# Patient Record
Sex: FEMALE | Race: WHITE | NOT HISPANIC OR LATINO
[De-identification: names, ages, dates, MRNs, and addresses within clinical notes are randomized per-mention and may not be internally consistent; named-entity substitution may affect disease eponyms.]

---

## 2018-04-29 PROBLEM — Z00.00 ENCOUNTER FOR PREVENTIVE HEALTH EXAMINATION: Status: ACTIVE | Noted: 2018-04-29

## 2018-05-17 ENCOUNTER — RECORD ABSTRACTING (OUTPATIENT)
Age: 48
End: 2018-05-17

## 2018-05-17 DIAGNOSIS — Z78.9 OTHER SPECIFIED HEALTH STATUS: ICD-10-CM

## 2018-05-17 DIAGNOSIS — Z87.09 PERSONAL HISTORY OF OTHER DISEASES OF THE RESPIRATORY SYSTEM: ICD-10-CM

## 2018-05-17 RX ORDER — MESALAMINE 1.2 G/1
1.2 TABLET, DELAYED RELEASE ORAL
Refills: 0 | Status: ACTIVE | COMMUNITY

## 2018-05-17 RX ORDER — LEVOTHYROXINE SODIUM 75 UG/1
75 TABLET ORAL
Refills: 0 | Status: ACTIVE | COMMUNITY

## 2018-05-29 ENCOUNTER — APPOINTMENT (OUTPATIENT)
Dept: BREAST CENTER | Facility: CLINIC | Age: 48
End: 2018-05-29
Payer: COMMERCIAL

## 2018-05-29 VITALS
HEART RATE: 80 BPM | DIASTOLIC BLOOD PRESSURE: 71 MMHG | HEIGHT: 66 IN | WEIGHT: 123 LBS | BODY MASS INDEX: 19.77 KG/M2 | SYSTOLIC BLOOD PRESSURE: 109 MMHG

## 2018-05-29 PROCEDURE — 99214 OFFICE O/P EST MOD 30 MIN: CPT

## 2018-12-11 ENCOUNTER — APPOINTMENT (OUTPATIENT)
Dept: BREAST CENTER | Facility: CLINIC | Age: 48
End: 2018-12-11
Payer: COMMERCIAL

## 2018-12-11 VITALS
HEIGHT: 66 IN | SYSTOLIC BLOOD PRESSURE: 120 MMHG | BODY MASS INDEX: 20.09 KG/M2 | DIASTOLIC BLOOD PRESSURE: 74 MMHG | HEART RATE: 76 BPM | WEIGHT: 125 LBS

## 2018-12-11 PROCEDURE — 99214 OFFICE O/P EST MOD 30 MIN: CPT

## 2019-06-04 ENCOUNTER — APPOINTMENT (OUTPATIENT)
Dept: BREAST CENTER | Facility: CLINIC | Age: 49
End: 2019-06-04
Payer: COMMERCIAL

## 2019-06-04 VITALS
DIASTOLIC BLOOD PRESSURE: 74 MMHG | HEART RATE: 75 BPM | SYSTOLIC BLOOD PRESSURE: 110 MMHG | BODY MASS INDEX: 20.09 KG/M2 | HEIGHT: 66 IN | WEIGHT: 125 LBS

## 2019-06-04 PROCEDURE — 99214 OFFICE O/P EST MOD 30 MIN: CPT

## 2019-06-04 NOTE — PHYSICAL EXAM
[Normocephalic] : normocephalic [Atraumatic] : atraumatic [Supple] : supple [No Supraclavicular Adenopathy] : no supraclavicular adenopathy [No Cervical Adenopathy] : no cervical adenopathy [No Thyromegaly] : no thyromegaly [Normal Sinus Rhythm] : normal sinus rhythm [Examined in the supine and seated position] : examined in the supine and seated position [No dominant masses] : no dominant masses in right breast  [No dominant masses] : no dominant masses left breast [No Nipple Retraction] : no left nipple retraction [No Nipple Discharge] : no right nipple discharge [No Axillary Lymphadenopathy] : no left axillary lymphadenopathy [No Edema] : no edema [No Rashes] : no rashes [No Ulceration] : no ulceration

## 2019-06-04 NOTE — HISTORY OF PRESENT ILLNESS
[FreeTextEntry1] : Dx'ed w/ ischemic colitis s/t anti phospholipid AB's. On ASAb\par Dx'ed w/ asthma. On Rx.\par +FH Br Ca (Mother 42 (BRCA-), M. Gr Aunt 50's)\par +FH Ov Ca (M. GM)\par  +Ashkenazi ancestry\par NCI Gwendolyn Risk (2014): 31.7%\par No other MH/FH changes. ROS reviewed/discussed. LMP 27 d's, reg. Taking Ca/Vit D.\par Mammo (4/2/19); HD, NSF\par MRI (7/30/18): NSF

## 2019-12-17 ENCOUNTER — APPOINTMENT (OUTPATIENT)
Dept: BREAST CENTER | Facility: CLINIC | Age: 49
End: 2019-12-17

## 2020-01-15 ENCOUNTER — APPOINTMENT (OUTPATIENT)
Dept: BREAST CENTER | Facility: CLINIC | Age: 50
End: 2020-01-15
Payer: COMMERCIAL

## 2020-01-15 VITALS
HEIGHT: 66 IN | HEART RATE: 82 BPM | WEIGHT: 128 LBS | DIASTOLIC BLOOD PRESSURE: 84 MMHG | SYSTOLIC BLOOD PRESSURE: 133 MMHG | BODY MASS INDEX: 20.57 KG/M2

## 2020-01-15 DIAGNOSIS — Z86.018 PERSONAL HISTORY OF OTHER BENIGN NEOPLASM: ICD-10-CM

## 2020-01-15 PROCEDURE — 99214 OFFICE O/P EST MOD 30 MIN: CPT

## 2020-01-15 NOTE — HISTORY OF PRESENT ILLNESS
[FreeTextEntry1] : Dx'ed w/ ischemic colitis s/t anti phospholipid AB's. On ASAb\par Dx'ed w/ asthma. On Rx.\par +FH Br Ca (Mother 42 (BRCA-), M. Gr Aunt 50's)\par +FH Ov Ca (M. GM)\par  +Ashkenazi ancestry\par NCI Gwendolyn Risk (2014): 31.7%\par Pt had Pelvic MRI (11/19) > +fibroids\par No other MH/FH changes. ROS reviewed/discussed. LMP 2 wks, reg. Taking Ca/Vit D.\par Mammo (4/2/19); HD, NSF\par MRI (10/30/19): NSF

## 2020-01-15 NOTE — REVIEW OF SYSTEMS
[Recent Weight Gain (___ Lbs)] : recent [unfilled] ~Ulb weight gain [Shortness Of Breath] : shortness of breath [Abdominal Pain] : abdominal pain [Diarrhea] : diarrhea [Negative] : Heme/Lymph

## 2020-10-20 ENCOUNTER — TRANSCRIPTION ENCOUNTER (OUTPATIENT)
Age: 50
End: 2020-10-20

## 2020-10-20 ENCOUNTER — APPOINTMENT (OUTPATIENT)
Dept: BREAST CENTER | Facility: CLINIC | Age: 50
End: 2020-10-20
Payer: COMMERCIAL

## 2020-10-20 VITALS
WEIGHT: 130 LBS | SYSTOLIC BLOOD PRESSURE: 143 MMHG | BODY MASS INDEX: 20.89 KG/M2 | DIASTOLIC BLOOD PRESSURE: 83 MMHG | HEIGHT: 66 IN | HEART RATE: 111 BPM

## 2020-10-20 PROCEDURE — 99214 OFFICE O/P EST MOD 30 MIN: CPT

## 2020-10-20 NOTE — PHYSICAL EXAM
[Atraumatic] : atraumatic [Normocephalic] : normocephalic [Supple] : supple [No Supraclavicular Adenopathy] : no supraclavicular adenopathy [No Thyromegaly] : no thyromegaly [No Cervical Adenopathy] : no cervical adenopathy [Normal Sinus Rhythm] : normal sinus rhythm [Examined in the supine and seated position] : examined in the supine and seated position [No dominant masses] : no dominant masses in right breast  [No dominant masses] : no dominant masses left breast [No Nipple Retraction] : no left nipple retraction [No Nipple Discharge] : no left nipple discharge [No Axillary Lymphadenopathy] : no right axillary lymphadenopathy [No Edema] : no edema [No Ulceration] : no ulceration [No Rashes] : no rashes

## 2020-10-20 NOTE — HISTORY OF PRESENT ILLNESS
[FreeTextEntry1] : Dx'ed w/ ischemic colitis s/t anti phospholipid AB's. On ASAb\par Dx'ed w/ asthma. On Rx.\par +FH Br Ca (Mother 42 (BRCA-), M. Gr Aunt 50's)\par +FH Ov Ca (M. GM)\par  +Ashkenazi ancestry\par NCI Gwendolyn Risk (2014): 31.7%\par Pt had Pelvic MRI (11/19) > +fibroids\par No other MH/FH changes. ROS reviewed/discussed. LMP 2 wks, reg. Taking Ca/Vit D.\par Mammo (4/2/19); HD, NSF\par MRI (10/16/20): +3mm enh mass L 6:00 > L MRI Bx rec'ed

## 2021-05-20 ENCOUNTER — APPOINTMENT (OUTPATIENT)
Dept: BREAST CENTER | Facility: CLINIC | Age: 51
End: 2021-05-20
Payer: COMMERCIAL

## 2021-05-20 ENCOUNTER — NON-APPOINTMENT (OUTPATIENT)
Age: 51
End: 2021-05-20

## 2021-05-20 VITALS — WEIGHT: 123 LBS | HEIGHT: 66 IN | BODY MASS INDEX: 19.77 KG/M2

## 2021-05-20 DIAGNOSIS — Z12.31 ENCOUNTER FOR SCREENING MAMMOGRAM FOR MALIGNANT NEOPLASM OF BREAST: ICD-10-CM

## 2021-05-20 PROCEDURE — 99072 ADDL SUPL MATRL&STAF TM PHE: CPT

## 2021-05-20 PROCEDURE — 99214 OFFICE O/P EST MOD 30 MIN: CPT

## 2021-05-20 NOTE — PHYSICAL EXAM
[Normocephalic] : normocephalic [Atraumatic] : atraumatic [Supple] : supple [No Supraclavicular Adenopathy] : no supraclavicular adenopathy [No Cervical Adenopathy] : no cervical adenopathy [No Thyromegaly] : no thyromegaly [Normal Sinus Rhythm] : normal sinus rhythm [Examined in the supine and seated position] : examined in the supine and seated position [No dominant masses] : no dominant masses in right breast  [No dominant masses] : no dominant masses left breast [No Nipple Retraction] : no left nipple retraction [No Nipple Discharge] : no left nipple discharge [No Axillary Lymphadenopathy] : no left axillary lymphadenopathy [No Edema] : no edema [No Rashes] : no rashes [No Ulceration] : no ulceration [de-identified] : +scattered, dense FC tissue\par NSF [de-identified] : +scattered, dense FC tissue\par NSF

## 2021-05-20 NOTE — HISTORY OF PRESENT ILLNESS
[FreeTextEntry1] : Dx'ed w/ ischemic colitis s/t anti phospholipid AB's. On ASAb\par Dx'ed w/ asthma. On Rx.\par +FH Br Ca (Mother 42 (BRCA-), M. Gr Aunt 50's)\par +FH Ov Ca (M. GM)\par  +Ashkenazi ancestry\par NCI Gwendolyn Risk (2014): 31.7%\par Pt had Pelvic MRI (11/19) > +fibroids\par No other MH/FH changes. ROS reviewed/discussed. LMP 2 wks, reg. Taking Ca/Vit D.\par R Mag Views (6/5/20): R UOQ calc's benign\par Mammo (6/1/20); HD, +1.5cm calc's R UOQ > R mag views rec'ed\par MRI (10/16/20): +3mm enh mass L 6:00 > L MRI Bx rec'ed

## 2021-11-23 ENCOUNTER — APPOINTMENT (OUTPATIENT)
Dept: BREAST CENTER | Facility: CLINIC | Age: 51
End: 2021-11-23
Payer: COMMERCIAL

## 2021-11-23 VITALS
SYSTOLIC BLOOD PRESSURE: 119 MMHG | DIASTOLIC BLOOD PRESSURE: 78 MMHG | BODY MASS INDEX: 20.09 KG/M2 | HEART RATE: 89 BPM | HEIGHT: 66 IN | WEIGHT: 125 LBS

## 2021-11-23 DIAGNOSIS — L23.9 ALLERGIC CONTACT DERMATITIS, UNSPECIFIED CAUSE: ICD-10-CM

## 2021-11-23 PROCEDURE — 99214 OFFICE O/P EST MOD 30 MIN: CPT

## 2021-11-23 RX ORDER — LEVOTHYROXINE SODIUM 88 UG/1
88 TABLET ORAL
Refills: 0 | Status: ACTIVE | COMMUNITY

## 2021-11-23 NOTE — HISTORY OF PRESENT ILLNESS
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 On ASAb\par Dx'ed w/ asthma. On Rx.\par +FH Br Ca (Mother 42 (BRCA-), M. Gr Aunt 50's)\par +FH Ov Ca (M. GM)\par  +Ashkenazi ancestry\par NCI Gwendolyn Risk (2014): 31.7%\par Pt had Pelvic MRI (11/19) > +fibroids\par Got Pfizer booster (10/21)(LUE)\par No other MH/FH changes. ROS reviewed/discussed. LMP 1 wk, irreg, HF's > poss neel M. Taking Ca/Vit D.\par R Mag Views (6/5/20): R UOQ calc's benign\par Mammo (6/10/21); HD, +prob bilat cyst slusters > F/U dx'ic Mammo/R targeted Sono (12/21) rec'ed\par MRI (6/14/21): +sig background enh, NSF

## 2021-11-23 NOTE — PHYSICAL EXAM
[Normocephalic] : normocephalic [Atraumatic] : atraumatic [Supple] : supple [No Supraclavicular Adenopathy] : no supraclavicular adenopathy [No Cervical Adenopathy] : no cervical adenopathy [No Thyromegaly] : no thyromegaly [Normal Sinus Rhythm] : normal sinus rhythm [Examined in the supine and seated position] : examined in the supine and seated position [No dominant masses] : no dominant masses in right breast  [No dominant masses] : no dominant masses left breast [No Nipple Retraction] : no left nipple retraction [No Nipple Discharge] : no left nipple discharge [No Axillary Lymphadenopathy] : no left axillary lymphadenopathy [No Edema] : no edema [No Rashes] : no rashes [No Ulceration] : no ulceration [de-identified] : +dense, tender FC tissue\par NSF [de-identified] : +dense, tender FC tissue\par NSF

## 2022-05-24 ENCOUNTER — APPOINTMENT (OUTPATIENT)
Dept: BREAST CENTER | Facility: CLINIC | Age: 52
End: 2022-05-24
Payer: COMMERCIAL

## 2022-05-24 VITALS
DIASTOLIC BLOOD PRESSURE: 88 MMHG | HEIGHT: 66 IN | WEIGHT: 123 LBS | HEART RATE: 87 BPM | SYSTOLIC BLOOD PRESSURE: 118 MMHG | BODY MASS INDEX: 19.77 KG/M2

## 2022-05-24 PROCEDURE — 99214 OFFICE O/P EST MOD 30 MIN: CPT

## 2022-05-24 RX ORDER — ALBUTEROL SULFATE 90 UG/1
INHALANT RESPIRATORY (INHALATION)
Refills: 0 | Status: DISCONTINUED | COMMUNITY
End: 2022-05-24

## 2022-05-24 RX ORDER — FLUTICASONE FUROATE AND VILANTEROL TRIFENATATE 50; 25 UG/1; UG/1
POWDER RESPIRATORY (INHALATION)
Refills: 0 | Status: DISCONTINUED | COMMUNITY
End: 2022-05-24

## 2022-05-24 RX ORDER — HYDROXYCHLOROQUINE SULFATE 200 MG/1
TABLET ORAL
Refills: 0 | Status: ACTIVE | COMMUNITY

## 2022-05-24 NOTE — PHYSICAL EXAM
[Normocephalic] : normocephalic [Atraumatic] : atraumatic [Supple] : supple [No Supraclavicular Adenopathy] : no supraclavicular adenopathy [No Cervical Adenopathy] : no cervical adenopathy [No Thyromegaly] : no thyromegaly [Normal Sinus Rhythm] : normal sinus rhythm [Examined in the supine and seated position] : examined in the supine and seated position [No dominant masses] : no dominant masses in right breast  [No dominant masses] : no dominant masses left breast [No Nipple Retraction] : no left nipple retraction [No Nipple Discharge] : no left nipple discharge [No Axillary Lymphadenopathy] : no left axillary lymphadenopathy [No Edema] : no edema [No Rashes] : no rashes [No Ulceration] : no ulceration [de-identified] : +tender, dense FC tissue\par NSF [de-identified] : +tender, dense FC tissue\par NSF

## 2022-05-24 NOTE — REVIEW OF SYSTEMS
[Recent Weight Loss (___ Lbs)] : recent [unfilled] ~Ulb weight loss [Cough] : cough [Diarrhea] : diarrhea [Negative] : Heme/Lymph

## 2022-05-24 NOTE — HISTORY OF PRESENT ILLNESS
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 On ASAb\par Dx'ed w/ asthma. On Rx.\par +FH Br Ca (Mother 42 (BRCA-), M. Gr Aunt 50's)\par +FH Ov Ca (M. GM)\par  +Ashkenazi ancestry\par NCI Gwendolyn Risk (2014): 31.7%\par Pt had Pelvic MRI (11/19) > +fibroids\par Had COVID (5/22) > sig sx's > recovering > got Paxlovid\par Got Pfizer booster (10/21)(LUE)\par No other MH/FH changes. ROS reviewed/discussed. LMP 1 wk, irreg. Taking Ca/Vit D.\par Mammo/Sono (1/19/22): HD, NSF\par MRI (6/14/21): +sig background enh, NSF

## 2022-09-14 ENCOUNTER — RESULT REVIEW (OUTPATIENT)
Age: 52
End: 2022-09-14

## 2022-10-17 ENCOUNTER — RESULT REVIEW (OUTPATIENT)
Age: 52
End: 2022-10-17

## 2022-11-15 ENCOUNTER — APPOINTMENT (OUTPATIENT)
Dept: BREAST CENTER | Facility: CLINIC | Age: 52
End: 2022-11-15

## 2022-11-15 VITALS
HEART RATE: 70 BPM | WEIGHT: 124 LBS | HEIGHT: 66 IN | DIASTOLIC BLOOD PRESSURE: 69 MMHG | SYSTOLIC BLOOD PRESSURE: 112 MMHG | BODY MASS INDEX: 19.93 KG/M2

## 2022-11-15 DIAGNOSIS — Z87.19 PERSONAL HISTORY OF OTHER DISEASES OF THE DIGESTIVE SYSTEM: ICD-10-CM

## 2022-11-15 DIAGNOSIS — R92.1 MAMMOGRAPHIC CALCIFICATION FOUND ON DIAGNOSTIC IMAGING OF BREAST: ICD-10-CM

## 2022-11-15 PROCEDURE — 99214 OFFICE O/P EST MOD 30 MIN: CPT

## 2022-11-15 RX ORDER — ALPRAZOLAM 0.25 MG/1
0.25 TABLET ORAL
Qty: 30 | Refills: 0 | Status: ACTIVE | COMMUNITY
Start: 2022-07-04

## 2022-11-15 RX ORDER — ENOXAPARIN SODIUM 40 MG/.4ML
40 INJECTION, SOLUTION SUBCUTANEOUS
Qty: 6 | Refills: 0 | Status: ACTIVE | COMMUNITY
Start: 2022-10-24

## 2022-11-15 NOTE — PHYSICAL EXAM
[Normocephalic] : normocephalic [Atraumatic] : atraumatic [Supple] : supple [No Supraclavicular Adenopathy] : no supraclavicular adenopathy [No Cervical Adenopathy] : no cervical adenopathy [No Thyromegaly] : no thyromegaly [Normal Sinus Rhythm] : normal sinus rhythm [Examined in the supine and seated position] : examined in the supine and seated position [No dominant masses] : no dominant masses in right breast  [No dominant masses] : no dominant masses left breast [No Nipple Retraction] : no left nipple retraction [No Nipple Discharge] : no left nipple discharge [No Axillary Lymphadenopathy] : no left axillary lymphadenopathy [No Edema] : no edema [No Rashes] : no rashes [No Ulceration] : no ulceration [de-identified] : +dense FC tissue\par NSF  [de-identified] : +dense FC tissue\par NSF

## 2022-11-15 NOTE — HISTORY OF PRESENT ILLNESS
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 On ASAb\par Dx'ed w/ asthma. On Rx.\par +FH Br Ca (Mother 42 (BRCA-), M. Gr Aunt 50's)\par +FH Ov Ca (M. GM)\par  +Ashkenazi ancestry\par NCI Gwendolyn Risk (2014): 31.7%\par TC Risk (11/22): 31.6%\par Pt had Pelvic MRI (11/19) > +fibroids\par +sig attack "ischemic colitis (6/22)\par Colonoscopy (9/22): "OK" > CT Abd w/ dye rec'ed\par Had COVID (5/22) > sig sx's > Paxlovid > recovered\par Got Pfizer booster (10/21)(LUE)\par No other MH/FH changes. ROS reviewed/discussed. LMP (9/22)  irreg > +HF's. Taking Ca/Vit D.\par Mammo/Sono (1/19/22): HD, NSF\par R MRI (10/18/22): +inum non spec enh foci, prev seen R 9-10:00 enh no longer seen > R MRI Bx cancelled > F/U MRI 6 mos rec'ed\par MRI (9/15/22): +R 9-10:00 enh > Bx rec'ed

## 2023-02-21 ENCOUNTER — APPOINTMENT (OUTPATIENT)
Dept: HEMATOLOGY ONCOLOGY | Facility: CLINIC | Age: 53
End: 2023-02-21

## 2023-02-21 ENCOUNTER — RESULT REVIEW (OUTPATIENT)
Age: 53
End: 2023-02-21

## 2023-02-21 NOTE — DISCUSSION/SUMMARY
[FreeTextEntry1] : The visit was provided via telehealth using real-time 2-way audio visual technology. The patient, Ca Sandra, was located at home in [place], [place], at the time of the visit. The provider, Liane Parsons MS, CGC was located at the medical office in [place], [place] at the time of the visit. Verbal consent for telehealth services was given on 2023 by the patient, Ca Sandra.\par \par REASON FOR CONSULT\par Ca Sandra is a 52-year-old female referred by Dr. Calos Stokes for cancer genetic counseling and risk assessment due to a family history of cancer. Ms. Sandra was seen on 2023 at which time medical and family history was ascertained and a pedigree constructed. \par \par RELEVANT MEDICAL HISTORY\par Ms. Sandra is a healthy individual with no reported history of cancer. She has a family history of breast and ovarian cancer, see below.\par \par OTHER MEDICAL AND SURGICAL HISTORY:\par •	Medical History: ischemic colitis, antiphospholipid AB’s, asthma, uterine fibroids, diffuse cystic mastopathy, mastodynia, Hashimoto’s\par •	Surgical History: , dilation and curettage, wisdom teeth, sinus\par \par OB/GYN HISTORY:\par Obstetrical History: \par Age at Menarche: 13\par Menopausal Status: Perimenopausal\par Age at First Live Birth: 34\par Oral Contraceptive Use: pill use reported for 2-3 years \par Hormone Replacement Therapy: None\par \par CANCER SCREENING HISTORY:  \par Breast: \par •	Mammography: annual, most recent on 2022, negative\par •	Sonography: annual, most recent on 2022, negative\par •	MRI: annual, most recent on 10/18/2022 d/t previously seen mass clumped enhancement that was not persistent, nonmass enhancing foci were present throughout right breast but decreased in number\par o	Reported starting high risk annual breast MRI screening around 2018 or several years earlier\par •	Biopsies: fibroadenomas biopsied in  and , right breast biopsy on 2009, negative\par GYN:\par •	Pelvic Examination: annual, most recent exam reported in 2023\par o	h/o fibroids being monitored, patient reported no other h/o gynecologic concerns\par Colon:\par •	Colonoscopy: 5-year frequency, most recent reported in 2022\par o	Baseline reported around  d/t GI symptoms\par o	Several done in past d/t ischemic colitis\par •	Upper Endoscopy: None\par Skin:  \par •	FBSE: annual, most recent exam reported within past year\par •	Lesions biopsied/removed: several benign removals reported\par \par SOCIAL HISTORY:\par •	Tobacco-product use: None\par •	Environmental exposures: None\par \par FAMILY HISTORY:\par Maternal and paternal ancestry were both reported as American and Ashkenazi Bahai. A detailed family history of cancer was ascertained, see below and scanned chart for pedigree. \par \par Of note, Ms. Sandra reported that her mother had negative BRCA1 and BRCA2  mutation and negative BRCA1 and BRCA2 sequencing analysis done in the early . A copy of this report was not available for review at the time of the session. To her knowledge no other family members have had germline testing for cancer susceptibility. Ashkenazi Bahai ancestry was confirmed in her maternal and paternal family histories. Consanguinity was denied. \par 	\par RISK ASSESSMENT:\par Ms. Sandra’s family history is suggestive of a hereditary cancer syndrome given her mother’s metastatic breast cancer diagnosis in her mid-40s and her maternal grandmother’s ovarian cancer diagnosis in her mid-80s, both in the setting of Ashkenazi Bahai ancestry. The patient meets National Comprehensive Cancer Network (NCCN) criteria for genetic testing. We recommended genetic testing for genes associated with breast and gynecological cancer. This test analyzes 19 genes: NAHUM, BARD1, BRCA1, BRCA2, BRIP1, CDH1, CHEK2, EPCAM, MLH1, MSH2, MSH6, NF1, PALB2, PMS2, PTEN, RAD51C, RAD51D, STK11, and TP53.\par \par The risks, benefits and limitations of genetic testing were discussed with Ms. Sandra. In addition, we discussed the purpose of genetic testing and possible test results (positive, negative, inconclusive) along with associated medical management options and psychosocial implications. Insurance coverage and potential out of pocket costs were also discussed. The Genetic Information Non-discrimination Act (WALI) was reviewed.\par \par It was explained that risk assessment is based upon medical and family history as provided and may change in the future should new information be obtained. \par \par Following our discussion, Ms. Sandra verbally consented to the above-mentioned genetic testing panel. A saliva kit will be shipped to her house and she can send a saliva sample to [facility] for analysis. \par \par PLAN:\par \par 1.	Saliva sample will be sent to [facility] for analysis. \par 2.	We will contact Ms. Sandra to schedule a follow-up appointment once the results are available. Results generally return in 2-3 weeks after the lab receives the saliva specimen. \par \par For any additional questions please call [facility] at [phone]. \par \par \par Liane Parsons MS, [facility]\par Genetic Counselor, Cancer Genetics\par \par \par CC: Calos Stokes MD

## 2023-04-26 ENCOUNTER — NON-APPOINTMENT (OUTPATIENT)
Age: 53
End: 2023-04-26

## 2023-05-12 ENCOUNTER — NON-APPOINTMENT (OUTPATIENT)
Age: 53
End: 2023-05-12

## 2023-05-16 ENCOUNTER — APPOINTMENT (OUTPATIENT)
Dept: BREAST CENTER | Facility: CLINIC | Age: 53
End: 2023-05-16
Payer: COMMERCIAL

## 2023-05-16 VITALS
BODY MASS INDEX: 21.05 KG/M2 | WEIGHT: 131 LBS | SYSTOLIC BLOOD PRESSURE: 124 MMHG | HEART RATE: 76 BPM | DIASTOLIC BLOOD PRESSURE: 80 MMHG | HEIGHT: 66 IN

## 2023-05-16 PROCEDURE — 99214 OFFICE O/P EST MOD 30 MIN: CPT

## 2023-05-16 RX ORDER — AMOXICILLIN AND CLAVULANATE POTASSIUM 875; 125 MG/1; MG/1
875-125 TABLET, COATED ORAL
Qty: 28 | Refills: 0 | Status: DISCONTINUED | COMMUNITY
Start: 2022-10-24 | End: 2023-05-16

## 2023-05-16 RX ORDER — NIRMATRELVIR AND RITONAVIR 300-100 MG
20 X 150 MG & KIT ORAL
Qty: 30 | Refills: 0 | Status: DISCONTINUED | COMMUNITY
Start: 2022-07-01 | End: 2023-05-16

## 2023-05-16 RX ORDER — METHYLPREDNISOLONE 16 MG/1
16 TABLET ORAL
Qty: 4 | Refills: 0 | Status: DISCONTINUED | COMMUNITY
Start: 2022-10-07 | End: 2023-05-16

## 2023-05-16 RX ORDER — MESALAMINE 4 G/60ML
4 ENEMA RECTAL
Qty: 1680 | Refills: 0 | Status: DISCONTINUED | COMMUNITY
Start: 2022-09-27 | End: 2023-05-16

## 2023-05-16 NOTE — HISTORY OF PRESENT ILLNESS
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 On ASAb\par Dx'ed w/ asthma. On Rx.\par +FH Br Ca (Mother 42 (BRCA-), M. Gr Aunt 50's)\par +FH Ov Ca (M. GM)\par  +Ashkenazi ancestry\par NCI Gwendolyn Risk (2014): 31.7%\par TC Risk (11/22): 31.6%\par Pt had Pelvic MRI (11/19) > +fibroids\par +sig attack "ischemic colitis (6/22)\par Colonoscopy (9/22): "OK" > CT Abd w/ dye rec'ed\par PAP/Pelvic (3/23): "WNL" \par Had COVID (5/22, 4/23) > sig sx's > Paxlovid > recovered\par Got Pfizer booster (10/21)(LUE)\par No other MH/FH changes. ROS reviewed/discussed. LMP (4/23)  irreg > +HF's. Taking Ca/Vit D.\par Mammo/Sono (3/22/23): HD, +L 3:00 N4 prob cyst cluster > L F/U targeted Sono (9/23) rec'ed\par R MRI (10/18/22): +inum non spec enh foci, prev seen R 9-10:00 enh no longer seen > R MRI Bx cancelled > F/U MRI 6 mos rec'ed > discussed > F/U MRI (9/23)\par MRI (9/15/22): +R 9-10:00 enh > Bx rec'ed

## 2023-05-16 NOTE — PHYSICAL EXAM
[Normocephalic] : normocephalic [Atraumatic] : atraumatic [Supple] : supple [No Supraclavicular Adenopathy] : no supraclavicular adenopathy [No Cervical Adenopathy] : no cervical adenopathy [No Thyromegaly] : no thyromegaly [Normal Sinus Rhythm] : normal sinus rhythm [Examined in the supine and seated position] : examined in the supine and seated position [No dominant masses] : no dominant masses in right breast  [No dominant masses] : no dominant masses left breast [No Nipple Retraction] : no left nipple retraction [No Nipple Discharge] : no left nipple discharge [No Axillary Lymphadenopathy] : no left axillary lymphadenopathy [No Edema] : no edema [No Rashes] : no rashes [No Ulceration] : no ulceration [de-identified] : +dense FC tissue\par NSF  [de-identified] : +dense FC tissue\par NSF

## 2023-07-31 ENCOUNTER — NON-APPOINTMENT (OUTPATIENT)
Age: 53
End: 2023-07-31

## 2023-08-01 NOTE — DISCUSSION/SUMMARY
[FreeTextEntry1] : REASON FOR CONSULT Ca Sandra is a 53-year-old female who was contacted via telephone on 7/31/2023 for a discussion regarding their genetic testing results related to hereditary cancer predisposition.   Ms. Sandra was originally seen at [facility] on 2/21/2023 for hereditary cancer predisposition risk assessment due to a family history of cancer. Ms. Sandra decided to pursue genetic testing using [facility]'s Breast and Gynecologic Cancers Guidelines-Based Panel.  TEST RESULTS:   CHEK2 POSITIVE (Low Penetrance) - c.1283C>T (p.[id number])  No pathogenic (disease-causing) variants or additional VUSs were detected in the following genes: NAHUM, BARD1, BRCA1, BRCA2, BRIP1, CDH1, CHEK2, EPCAM, MLH1, MSH2, MSH6, NF1, PALB2, PMS2, PTEN, RAD51C, RAD51D, STK11, TP53.   RESULTS INTERPRETATION AND ASSESSMENT: We reviewed with Ms. 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   Please note, this variant is classified as low penetrant as it likely does not confer the same level of cancer risks as protein truncating pathogenic variants in the CHEK2 gene.    As part of our discussion, we reviewed the cancer risks associated with the CHEK2 (c.1283C>T (p.[id number])) mutation:   BREAST: The CHEK2 c.1283C>T (p.S428F) variant is associated with a modest increase in female breast cancer risk (approximately1.6-fold increased risk). Thus, the cumulative risk of female breast cancer associated with this CHEK2 variant is approximately 13% compared to the general population risk of 8% through age 70.    COLON: The CHEK2 c.1283C>T (p.S428F) variant is associated with a modest increase in colorectal cancer risk (approximately 1.48-1.67-fold increased risk). Thus, the cumulative lifetime risk of colorectal cancer in unscreened individuals is approximately 6% for women and 7-8% for men compared to the general population risk of approximately 4.2-4.8% through age 85.   OTHER CANCER RISKS:  Studies have described a possible increased risk for a wide range of other cancers associated with CHEK2 mutations. However, these studies are not conclusive, and incremental screening or risk-reduction for these possible associations is not recommended at this time.    We also discussed that, while no cause of the patient's personal and family history of cancer was identified, this result, while reassuring, does entirely not rule out a hereditary cancer risk in the patient. It is possible, although unlikely, the patient has a mutation in one of the genes tested that is not detectable by this analysis, or has a mutation in a different gene, either known or unknown. It is also possible there is a hereditary cancer predisposition in the family, but the patient did not inherit it.  IMPLICATIONS FOR THE PATIENT: Given Ms. Sandra's current reported family history of cancer, and her CHEK2 positive genetic test results, the following screening guidelines and risk-reducing recommendations were discussed:  BREAST:  Individuals with a low penetrance missense mutation in CHEK2 should begin annual mammograms at age 40 (or 5-10 years younger than the earliest diagnosis of cancer in the family).  Given Ms. Sandra's family history, she is thought to be at an increased risk for breast cancer. She currently undergoes annual breast MRIs and mammograms with Dr. Calso Stokes, and she should continue to follow this high-risk screening protocol at Dr. Stokes's discretion based on her personal medical history, family history, and low penetrance CHEK2 mutation.   COLON: As per current NCCN guidelines, individuals with pathogenic CHEK2 mutations should undergo colonoscopy every 5 years beginning at age 40 (or 10 years prior to age of a first-degree relative's CRC diagnosis).    However, given the low-penetrance status of this mutation, it is reasonable to consider prolonging the interval between colonoscopy to every 5-10 years at the discretion of the patient's treating gastroenterologist. Ms. Sandra currently undergoes colonoscopies every 5 years given her history of ischemic colitis, and she should continue to follow this interval at the discretion of her gastroenterologist.   OTHER:  - In the absence of other indications, Ms. Sandra should practice age-appropriate cancer screening of other organ systems as recommended for the general population.  IMPLICATIONS FOR FAMILY MEMBERS: This mutation is inherited in an autosomal dominant pattern. We recommend the patient's first-degree relatives, specifically her daughters and brother, pursue genetic counseling and genetic testing as there is a 50% chance they also have the same mutation. Ms. Sandra was made aware that if any at-risk relatives wanted to pursue genetic testing any time in the future, we would be happy to see them and coordinate testing.   The risk of passing on this mutation to a future generation is 50%. We recommend that the patient's children pursue genetic counseling and genetic testing. Please note, we do not generally recommend genetic testing for children until they are over the age of 18. Given the low penetrance CHEK2 mutation, we discussed that it is reasonable for her daughters to wait and pursue genetic counseling and testing in their 20s-30s.   PLAN: 1.	See above note for recommended management. 2.	We encouraged sharing these results with family members. They have a risk to have inherited the same mutation. Other family may benefit from genetic testing and should contact a certified genetic counselor specializing in cancer. Due to HIPAA and [place] laws, [facility] is unable to directly contact other family at risk, but we are available should family members wish to reach out to us. 3.	Family support resources and referrals were provided.  4.	Patient informed consult note(s) will be available through their [facility] patient portal and genetic test results will be released via [facility]'s laboratory portal.  5.	Genetic knowledge changes rapidly. We encouraged re-contacting [facility] every 2-3 years for any changes in screening recommendations or sooner if there are significant changes in personal or family history.  For any additional questions please call [facility] at [phone].    Liane Parsons MS, [facility] Genetic Counselor, Cancer [facility]etics  Jayson Gould MD Chief, Cancer Genetics   CC: Calos Stokes MD

## 2023-08-03 ENCOUNTER — RESULT REVIEW (OUTPATIENT)
Age: 53
End: 2023-08-03

## 2023-09-17 ENCOUNTER — RESULT REVIEW (OUTPATIENT)
Age: 53
End: 2023-09-17

## 2023-12-07 ENCOUNTER — APPOINTMENT (OUTPATIENT)
Dept: BREAST CENTER | Facility: CLINIC | Age: 53
End: 2023-12-07
Payer: COMMERCIAL

## 2023-12-07 VITALS
SYSTOLIC BLOOD PRESSURE: 120 MMHG | WEIGHT: 134 LBS | HEART RATE: 76 BPM | HEIGHT: 66 IN | BODY MASS INDEX: 21.53 KG/M2 | DIASTOLIC BLOOD PRESSURE: 97 MMHG

## 2023-12-07 PROCEDURE — 99213 OFFICE O/P EST LOW 20 MIN: CPT

## 2023-12-07 RX ORDER — PREDNISONE 10 MG/1
10 TABLET ORAL
Qty: 30 | Refills: 0 | Status: DISCONTINUED | COMMUNITY
Start: 2022-10-24 | End: 2023-12-07

## 2023-12-07 RX ORDER — BIMATOPROST 0.3 MG/ML
0.03 SOLUTION/ DROPS OPHTHALMIC
Qty: 5 | Refills: 0 | Status: DISCONTINUED | COMMUNITY
Start: 2021-11-01 | End: 2023-12-07

## 2024-04-23 ENCOUNTER — RESULT REVIEW (OUTPATIENT)
Age: 54
End: 2024-04-23

## 2024-06-25 ENCOUNTER — APPOINTMENT (OUTPATIENT)
Dept: BREAST CENTER | Facility: CLINIC | Age: 54
End: 2024-06-25
Payer: COMMERCIAL

## 2024-06-25 VITALS — WEIGHT: 132 LBS | BODY MASS INDEX: 21.21 KG/M2 | HEIGHT: 66 IN

## 2024-06-25 VITALS
SYSTOLIC BLOOD PRESSURE: 137 MMHG | HEART RATE: 87 BPM | HEIGHT: 66 IN | BODY MASS INDEX: 21.21 KG/M2 | WEIGHT: 132 LBS | DIASTOLIC BLOOD PRESSURE: 82 MMHG

## 2024-06-25 DIAGNOSIS — R92.8 OTHER ABNORMAL AND INCONCLUSIVE FINDINGS ON DIAGNOSTIC IMAGING OF BREAST: ICD-10-CM

## 2024-06-25 DIAGNOSIS — Z91.89 OTHER SPECIFIED PERSONAL RISK FACTORS, NOT ELSEWHERE CLASSIFIED: ICD-10-CM

## 2024-06-25 DIAGNOSIS — N60.19 DIFFUSE CYSTIC MASTOPATHY OF UNSPECIFIED BREAST: ICD-10-CM

## 2024-06-25 DIAGNOSIS — Z15.89 GENETIC SUSCEPTIBILITY TO MALIGNANT NEOPLASM OF BREAST: ICD-10-CM

## 2024-06-25 DIAGNOSIS — Z15.01 GENETIC SUSCEPTIBILITY TO MALIGNANT NEOPLASM OF BREAST: ICD-10-CM

## 2024-06-25 DIAGNOSIS — Z80.3 FAMILY HISTORY OF MALIGNANT NEOPLASM OF BREAST: ICD-10-CM

## 2024-06-25 DIAGNOSIS — N95.1 MENOPAUSAL AND FEMALE CLIMACTERIC STATES: ICD-10-CM

## 2024-06-25 DIAGNOSIS — N64.4 MASTODYNIA: ICD-10-CM

## 2024-06-25 DIAGNOSIS — Z15.09 GENETIC SUSCEPTIBILITY TO MALIGNANT NEOPLASM OF BREAST: ICD-10-CM

## 2024-06-25 DIAGNOSIS — R92.30 DENSE BREASTS, UNSPECIFIED: ICD-10-CM

## 2024-06-25 DIAGNOSIS — Z80.41 FAMILY HISTORY OF MALIGNANT NEOPLASM OF OVARY: ICD-10-CM

## 2024-06-25 DIAGNOSIS — Z15.02 GENETIC SUSCEPTIBILITY TO MALIGNANT NEOPLASM OF BREAST: ICD-10-CM

## 2024-06-25 DIAGNOSIS — Z86.16 PERSONAL HISTORY OF COVID-19: ICD-10-CM

## 2024-06-25 PROCEDURE — 99214 OFFICE O/P EST MOD 30 MIN: CPT

## 2024-06-25 RX ORDER — SPIRONOLACTONE 100 MG/1
100 TABLET ORAL
Refills: 0 | Status: ACTIVE | COMMUNITY

## 2024-06-25 RX ORDER — AMITRIPTYLINE HYDROCHLORIDE 75 MG/1
TABLET, FILM COATED ORAL
Refills: 0 | Status: ACTIVE | COMMUNITY

## 2024-06-25 RX ORDER — ENOXAPARIN SODIUM 300 MG/3ML
INJECTION INTRAVENOUS; SUBCUTANEOUS
Refills: 0 | Status: DISCONTINUED | COMMUNITY
End: 2024-06-25

## 2024-09-22 ENCOUNTER — RESULT REVIEW (OUTPATIENT)
Age: 54
End: 2024-09-22

## 2025-02-20 ENCOUNTER — APPOINTMENT (OUTPATIENT)
Dept: BREAST CENTER | Facility: CLINIC | Age: 55
End: 2025-02-20
Payer: COMMERCIAL

## 2025-02-20 VITALS
WEIGHT: 133 LBS | BODY MASS INDEX: 21.38 KG/M2 | DIASTOLIC BLOOD PRESSURE: 84 MMHG | HEART RATE: 101 BPM | HEIGHT: 66 IN | SYSTOLIC BLOOD PRESSURE: 124 MMHG

## 2025-02-20 DIAGNOSIS — Z86.16 PERSONAL HISTORY OF COVID-19: ICD-10-CM

## 2025-02-20 DIAGNOSIS — Z91.89 OTHER SPECIFIED PERSONAL RISK FACTORS, NOT ELSEWHERE CLASSIFIED: ICD-10-CM

## 2025-02-20 DIAGNOSIS — Z80.3 FAMILY HISTORY OF MALIGNANT NEOPLASM OF BREAST: ICD-10-CM

## 2025-02-20 DIAGNOSIS — Z80.41 FAMILY HISTORY OF MALIGNANT NEOPLASM OF OVARY: ICD-10-CM

## 2025-02-20 DIAGNOSIS — Z15.02 GENETIC SUSCEPTIBILITY TO MALIGNANT NEOPLASM OF BREAST: ICD-10-CM

## 2025-02-20 DIAGNOSIS — R92.30 DENSE BREASTS, UNSPECIFIED: ICD-10-CM

## 2025-02-20 DIAGNOSIS — N60.19 DIFFUSE CYSTIC MASTOPATHY OF UNSPECIFIED BREAST: ICD-10-CM

## 2025-02-20 DIAGNOSIS — Z15.01 GENETIC SUSCEPTIBILITY TO MALIGNANT NEOPLASM OF BREAST: ICD-10-CM

## 2025-02-20 DIAGNOSIS — Z15.09 GENETIC SUSCEPTIBILITY TO MALIGNANT NEOPLASM OF BREAST: ICD-10-CM

## 2025-02-20 DIAGNOSIS — N95.1 MENOPAUSAL AND FEMALE CLIMACTERIC STATES: ICD-10-CM

## 2025-02-20 DIAGNOSIS — Z15.89 GENETIC SUSCEPTIBILITY TO MALIGNANT NEOPLASM OF BREAST: ICD-10-CM

## 2025-02-20 PROCEDURE — 99213 OFFICE O/P EST LOW 20 MIN: CPT

## 2025-02-20 RX ORDER — DOXYCLYCLINE HYCLATE 150 MG/1
TABLET, COATED ORAL
Refills: 0 | Status: ACTIVE | COMMUNITY

## 2025-05-29 ENCOUNTER — RESULT REVIEW (OUTPATIENT)
Age: 55
End: 2025-05-29

## 2025-09-08 ENCOUNTER — NON-APPOINTMENT (OUTPATIENT)
Age: 55
End: 2025-09-08

## 2025-09-09 ENCOUNTER — APPOINTMENT (OUTPATIENT)
Dept: BREAST CENTER | Facility: CLINIC | Age: 55
End: 2025-09-09
Payer: SELF-PAY

## 2025-09-09 VITALS
SYSTOLIC BLOOD PRESSURE: 117 MMHG | BODY MASS INDEX: 21.86 KG/M2 | HEIGHT: 66 IN | DIASTOLIC BLOOD PRESSURE: 74 MMHG | WEIGHT: 136 LBS

## 2025-09-09 DIAGNOSIS — N60.19 DIFFUSE CYSTIC MASTOPATHY OF UNSPECIFIED BREAST: ICD-10-CM

## 2025-09-09 DIAGNOSIS — Z15.01 GENETIC SUSCEPTIBILITY TO MALIGNANT NEOPLASM OF BREAST: ICD-10-CM

## 2025-09-09 DIAGNOSIS — Z80.3 FAMILY HISTORY OF MALIGNANT NEOPLASM OF BREAST: ICD-10-CM

## 2025-09-09 DIAGNOSIS — Z80.41 FAMILY HISTORY OF MALIGNANT NEOPLASM OF OVARY: ICD-10-CM

## 2025-09-09 DIAGNOSIS — Z86.16 PERSONAL HISTORY OF COVID-19: ICD-10-CM

## 2025-09-09 DIAGNOSIS — Z15.02 GENETIC SUSCEPTIBILITY TO MALIGNANT NEOPLASM OF BREAST: ICD-10-CM

## 2025-09-09 DIAGNOSIS — N95.1 MENOPAUSAL AND FEMALE CLIMACTERIC STATES: ICD-10-CM

## 2025-09-09 DIAGNOSIS — Z15.09 GENETIC SUSCEPTIBILITY TO MALIGNANT NEOPLASM OF BREAST: ICD-10-CM

## 2025-09-09 DIAGNOSIS — R92.30 DENSE BREASTS, UNSPECIFIED: ICD-10-CM

## 2025-09-09 DIAGNOSIS — Z15.89 GENETIC SUSCEPTIBILITY TO MALIGNANT NEOPLASM OF BREAST: ICD-10-CM

## 2025-09-09 DIAGNOSIS — Z91.89 OTHER SPECIFIED PERSONAL RISK FACTORS, NOT ELSEWHERE CLASSIFIED: ICD-10-CM

## 2025-09-09 PROCEDURE — 99214 OFFICE O/P EST MOD 30 MIN: CPT

## 2025-09-09 RX ORDER — ISOTRETINOIN 20 MG/1
20 CAPSULE, GELATIN COATED ORAL
Refills: 0 | Status: ACTIVE | COMMUNITY